# Patient Record
Sex: MALE | Race: WHITE | NOT HISPANIC OR LATINO | Employment: UNEMPLOYED | URBAN - METROPOLITAN AREA
[De-identification: names, ages, dates, MRNs, and addresses within clinical notes are randomized per-mention and may not be internally consistent; named-entity substitution may affect disease eponyms.]

---

## 2019-10-19 ENCOUNTER — HOSPITAL ENCOUNTER (EMERGENCY)
Facility: HOSPITAL | Age: 54
Discharge: HOME | End: 2019-10-19
Attending: EMERGENCY MEDICINE | Admitting: EMERGENCY MEDICINE

## 2019-10-19 ENCOUNTER — APPOINTMENT (OUTPATIENT)
Dept: GENERAL RADIOLOGY | Facility: HOSPITAL | Age: 54
End: 2019-10-19

## 2019-10-19 VITALS
OXYGEN SATURATION: 96 % | HEART RATE: 94 BPM | BODY MASS INDEX: 18.61 KG/M2 | SYSTOLIC BLOOD PRESSURE: 135 MMHG | RESPIRATION RATE: 18 BRPM | DIASTOLIC BLOOD PRESSURE: 100 MMHG | HEIGHT: 70 IN | TEMPERATURE: 98.3 F | WEIGHT: 130 LBS

## 2019-10-19 DIAGNOSIS — J98.8 VIRAL RESPIRATORY ILLNESS: Primary | ICD-10-CM

## 2019-10-19 DIAGNOSIS — B97.89 VIRAL RESPIRATORY ILLNESS: Primary | ICD-10-CM

## 2019-10-19 DIAGNOSIS — R06.00 DYSPNEA, UNSPECIFIED TYPE: ICD-10-CM

## 2019-10-19 DIAGNOSIS — R06.2 WHEEZING: ICD-10-CM

## 2019-10-19 LAB
ANION GAP SERPL CALCULATED.3IONS-SCNC: 10.3 MMOL/L (ref 5–15)
BASOPHILS # BLD AUTO: 0.04 10*3/MM3 (ref 0–0.2)
BASOPHILS NFR BLD AUTO: 0.4 % (ref 0–1.5)
BUN BLD-MCNC: 10 MG/DL (ref 6–20)
BUN/CREAT SERPL: 16.4 (ref 7–25)
CALCIUM SPEC-SCNC: 9.9 MG/DL (ref 8.6–10.5)
CHLORIDE SERPL-SCNC: 88 MMOL/L (ref 98–107)
CO2 SERPL-SCNC: 31.7 MMOL/L (ref 22–29)
CREAT BLD-MCNC: 0.61 MG/DL (ref 0.76–1.27)
DEPRECATED RDW RBC AUTO: 41.3 FL (ref 37–54)
EOSINOPHIL # BLD AUTO: 0.05 10*3/MM3 (ref 0–0.4)
EOSINOPHIL NFR BLD AUTO: 0.6 % (ref 0.3–6.2)
ERYTHROCYTE [DISTWIDTH] IN BLOOD BY AUTOMATED COUNT: 12.8 % (ref 12.3–15.4)
FLUAV AG NPH QL: NEGATIVE
FLUBV AG NPH QL IA: NEGATIVE
GFR SERPL CREATININE-BSD FRML MDRD: 139 ML/MIN/1.73
GLUCOSE BLD-MCNC: 104 MG/DL (ref 65–99)
HCT VFR BLD AUTO: 46.1 % (ref 37.5–51)
HGB BLD-MCNC: 14.8 G/DL (ref 13–17.7)
IMM GRANULOCYTES # BLD AUTO: 0.02 10*3/MM3 (ref 0–0.05)
IMM GRANULOCYTES NFR BLD AUTO: 0.2 % (ref 0–0.5)
LYMPHOCYTES # BLD AUTO: 1.14 10*3/MM3 (ref 0.7–3.1)
LYMPHOCYTES NFR BLD AUTO: 12.8 % (ref 19.6–45.3)
MCH RBC QN AUTO: 28.2 PG (ref 26.6–33)
MCHC RBC AUTO-ENTMCNC: 32.1 G/DL (ref 31.5–35.7)
MCV RBC AUTO: 87.8 FL (ref 79–97)
MONOCYTES # BLD AUTO: 0.37 10*3/MM3 (ref 0.1–0.9)
MONOCYTES NFR BLD AUTO: 4.1 % (ref 5–12)
NEUTROPHILS # BLD AUTO: 7.3 10*3/MM3 (ref 1.7–7)
NEUTROPHILS NFR BLD AUTO: 81.9 % (ref 42.7–76)
NRBC BLD AUTO-RTO: 0 /100 WBC (ref 0–0.2)
PLATELET # BLD AUTO: 335 10*3/MM3 (ref 140–450)
PMV BLD AUTO: 8.4 FL (ref 6–12)
POTASSIUM BLD-SCNC: 4.4 MMOL/L (ref 3.5–5.2)
PROCALCITONIN SERPL-MCNC: 0.02 NG/ML (ref 0.1–0.25)
RBC # BLD AUTO: 5.25 10*6/MM3 (ref 4.14–5.8)
SODIUM BLD-SCNC: 130 MMOL/L (ref 136–145)
WBC NRBC COR # BLD: 8.92 10*3/MM3 (ref 3.4–10.8)

## 2019-10-19 PROCEDURE — 85025 COMPLETE CBC W/AUTO DIFF WBC: CPT | Performed by: EMERGENCY MEDICINE

## 2019-10-19 PROCEDURE — 71045 X-RAY EXAM CHEST 1 VIEW: CPT

## 2019-10-19 PROCEDURE — 99284 EMERGENCY DEPT VISIT MOD MDM: CPT

## 2019-10-19 PROCEDURE — 84145 PROCALCITONIN (PCT): CPT | Performed by: EMERGENCY MEDICINE

## 2019-10-19 PROCEDURE — 94640 AIRWAY INHALATION TREATMENT: CPT

## 2019-10-19 PROCEDURE — 87804 INFLUENZA ASSAY W/OPTIC: CPT | Performed by: EMERGENCY MEDICINE

## 2019-10-19 PROCEDURE — 93005 ELECTROCARDIOGRAM TRACING: CPT | Performed by: EMERGENCY MEDICINE

## 2019-10-19 PROCEDURE — 80048 BASIC METABOLIC PNL TOTAL CA: CPT | Performed by: EMERGENCY MEDICINE

## 2019-10-19 PROCEDURE — 94799 UNLISTED PULMONARY SVC/PX: CPT

## 2019-10-19 RX ORDER — IPRATROPIUM BROMIDE AND ALBUTEROL SULFATE 2.5; .5 MG/3ML; MG/3ML
3 SOLUTION RESPIRATORY (INHALATION) ONCE
Status: COMPLETED | OUTPATIENT
Start: 2019-10-19 | End: 2019-10-19

## 2019-10-19 RX ORDER — ALBUTEROL SULFATE 90 UG/1
2 AEROSOL, METERED RESPIRATORY (INHALATION) EVERY 4 HOURS PRN
Qty: 1 INHALER | Refills: 0 | Status: SHIPPED | OUTPATIENT
Start: 2019-10-19

## 2019-10-19 RX ORDER — PREDNISONE 50 MG/1
50 TABLET ORAL DAILY
Qty: 4 TABLET | Refills: 0 | Status: SHIPPED | OUTPATIENT
Start: 2019-10-19

## 2019-10-19 RX ADMIN — IPRATROPIUM BROMIDE AND ALBUTEROL SULFATE 3 ML: 2.5; .5 SOLUTION RESPIRATORY (INHALATION) at 15:42

## 2019-10-19 NOTE — ED PROVIDER NOTES
EMERGENCY DEPARTMENT ENCOUNTER    Room Number:  38/38  Date of encounter:  10/19/2019  PCP: Tonya Gonzalez MD  Historian: Patient      HPI:  Chief Complaint: Shortness of breath  A complete HPI/ROS/PMH/PSH/SH/FH are unobtainable due to: None    Context: Ankit Aguilera is a 54 y.o. male who presents to the ED c/o proximally 1 month of breathing issues, with coughing productive green phlegm.  Family member states that he has not been getting any better, and she finally was able to force him to get checked out today and went to urgent care.  There they recommended ER evaluation.  He was given IV Solu-Medrol at the urgent care, and a DuoNeb in route with EMS.  He does feel better after those treatments.  Patient has not been using anything at home, is a current smoker.  Denies measured fevers, chest pains, dizziness, nausea, vomiting, diarrhea.      PAST MEDICAL HISTORY  Active Ambulatory Problems     Diagnosis Date Noted   • No Active Ambulatory Problems     Resolved Ambulatory Problems     Diagnosis Date Noted   • No Resolved Ambulatory Problems     No Additional Past Medical History         PAST SURGICAL HISTORY  Past Surgical History:   Procedure Laterality Date   • FOOT SURGERY           FAMILY HISTORY  History reviewed. No pertinent family history.      SOCIAL HISTORY  Social History     Socioeconomic History   • Marital status: Single     Spouse name: Not on file   • Number of children: Not on file   • Years of education: Not on file   • Highest education level: Not on file   Tobacco Use   • Smoking status: Current Every Day Smoker     Packs/day: 1.50     Types: Cigarettes   • Smokeless tobacco: Never Used         ALLERGIES  Patient has no known allergies.        REVIEW OF SYSTEMS  Review of Systems     All systems reviewed and negative except for those discussed in HPI.       PHYSICAL EXAM    I have reviewed the triage vital signs and nursing notes.    ED Triage Vitals [10/19/19 1510]   Temp Heart Rate Resp BP  SpO2   -- 87 24 (!) 174/111 98 %      Temp src Heart Rate Source Patient Position BP Location FiO2 (%)   -- -- -- -- --       Physical Exam   Constitutional: He is oriented to person, place, and time and well-developed, well-nourished, and in no distress. No distress.   HENT:   Head: Normocephalic.   Mouth/Throat: Mucous membranes are normal.   Eyes: EOM are normal.   Neck: Normal range of motion.   Cardiovascular: Normal rate, regular rhythm, normal heart sounds and intact distal pulses. Exam reveals no gallop and no friction rub.   No murmur heard.  Pulmonary/Chest: Effort normal. No stridor. No respiratory distress (No increased work of breathing). He has wheezes (mild end-expiratory wheezes diffusely). He has no rhonchi. He has no rales.   Diminished breath sounds in the bases   Abdominal: Soft. He exhibits no distension. There is no tenderness. There is no guarding.   Musculoskeletal: Normal range of motion. He exhibits no deformity.   Neurological: He is alert and oriented to person, place, and time. GCS score is 15.   moving all extremities, no focal deficits   Skin: Skin is warm and dry. He is not diaphoretic.   Psychiatric:   Calm, cooperative   Nursing note and vitals reviewed.          LAB RESULTS  Recent Results (from the past 24 hour(s))   Basic Metabolic Panel    Collection Time: 10/19/19  3:36 PM   Result Value Ref Range    Glucose 104 (H) 65 - 99 mg/dL    BUN 10 6 - 20 mg/dL    Creatinine 0.61 (L) 0.76 - 1.27 mg/dL    Sodium 130 (L) 136 - 145 mmol/L    Potassium 4.4 3.5 - 5.2 mmol/L    Chloride 88 (L) 98 - 107 mmol/L    CO2 31.7 (H) 22.0 - 29.0 mmol/L    Calcium 9.9 8.6 - 10.5 mg/dL    eGFR Non African Amer 139 >60 mL/min/1.73    BUN/Creatinine Ratio 16.4 7.0 - 25.0    Anion Gap 10.3 5.0 - 15.0 mmol/L   Procalcitonin    Collection Time: 10/19/19  3:36 PM   Result Value Ref Range    Procalcitonin 0.02 (L) 0.10 - 0.25 ng/mL   CBC Auto Differential    Collection Time: 10/19/19  3:36 PM   Result Value  Ref Range    WBC 8.92 3.40 - 10.80 10*3/mm3    RBC 5.25 4.14 - 5.80 10*6/mm3    Hemoglobin 14.8 13.0 - 17.7 g/dL    Hematocrit 46.1 37.5 - 51.0 %    MCV 87.8 79.0 - 97.0 fL    MCH 28.2 26.6 - 33.0 pg    MCHC 32.1 31.5 - 35.7 g/dL    RDW 12.8 12.3 - 15.4 %    RDW-SD 41.3 37.0 - 54.0 fl    MPV 8.4 6.0 - 12.0 fL    Platelets 335 140 - 450 10*3/mm3    Neutrophil % 81.9 (H) 42.7 - 76.0 %    Lymphocyte % 12.8 (L) 19.6 - 45.3 %    Monocyte % 4.1 (L) 5.0 - 12.0 %    Eosinophil % 0.6 0.3 - 6.2 %    Basophil % 0.4 0.0 - 1.5 %    Immature Grans % 0.2 0.0 - 0.5 %    Neutrophils, Absolute 7.30 (H) 1.70 - 7.00 10*3/mm3    Lymphocytes, Absolute 1.14 0.70 - 3.10 10*3/mm3    Monocytes, Absolute 0.37 0.10 - 0.90 10*3/mm3    Eosinophils, Absolute 0.05 0.00 - 0.40 10*3/mm3    Basophils, Absolute 0.04 0.00 - 0.20 10*3/mm3    Immature Grans, Absolute 0.02 0.00 - 0.05 10*3/mm3    nRBC 0.0 0.0 - 0.2 /100 WBC   Influenza Antigen, Rapid - Swab, Nasopharynx    Collection Time: 10/19/19  3:40 PM   Result Value Ref Range    Influenza A Ag, EIA Negative Negative    Influenza B Ag, EIA Negative Negative       Ordered the above labs and independently reviewed the results.        RADIOLOGY  Xr Chest 1 View    Result Date: 10/19/2019  EXAMINATION: SINGLE VIEW CHEST RADIOGRAPH  HISTORY: 54-year-old male with history of dyspnea.  FINDINGS: A semierect AP portable chest radiograph was obtained. No prior examination is available for comparison. The lungs are normal in volume and are clear of consolidation. A calcified granuloma is seen within the right mid lung field. The cardiomediastinal silhouette and osseous structures appear normal.      There is no evidence for an active or acute cardiopulmonary process.  This report was finalized on 10/19/2019 4:19 PM by Dr. Ernst oGuld M.D.        I ordered the above noted radiological studies. Reviewed by me and discussed with radiologist.  See dictation for official radiology  interpretation.      PROCEDURES    Procedures   EKG    EKG Time: 1529  Rhythm/Rate: Sinus rhythm with rate of 97  Nml axis  Nml intervals   No acute ischemic changes  No STEMI     Interpreted Contemporaneously by me, independently viewed  No prior        MEDICATIONS GIVEN IN ER    Medications   ipratropium-albuterol (DUO-NEB) nebulizer solution 3 mL (3 mL Nebulization Given 10/19/19 1542)         PROGRESS, DATA ANALYSIS, CONSULTS, AND MEDICAL DECISION MAKING    All labs have been independently reviewed by me.  All radiology studies have been reviewed by me and discussed with radiologist dictating the report.   EKG's independently viewed and interpreted by me.  Discussion below represents my analysis of pertinent findings related to patient's condition, differential diagnosis, treatment plan and final disposition.        ED Course as of Oct 19 1951   Sat Oct 19, 2019   1554 Patient reevaluated after DuoNeb, reports feeling even better, no residual wheezing, no increased work of breathing, clear breath sounds bilaterally with 100% oxygen on room air.  [DC]   1625 Patient comes in today for evaluation of 1 month of respiratory issues.  Is a current smoker and has been for a long time.  No prior diagnosis of COPD.  Likely viral infection aggravating likely undiagnosed COPD.  Significant improved after 2 DuoNeb treatments, one with EMS, one in the emergency department.  Has already received IV Solu-Medrol.  Will be discharged home with a prescription for an albuterol inhaler and oral steroids, advised to follow-up with his primary care provider next week, and return to the emergency department for worsening symptoms as needed.  No evidence of bacterial pneumonia on labs or imaging today.  [DC]   1635 Patient on unremarkable findings today, he is comfortable going home and is feeling significantly better.  He is well-appearing overall.  He understands and agrees with course and plan and discharge instructions.  [DC]       ED Course User Index  [DC] Denis Wang MD       AS OF 7:51 PM VITALS:    BP - 135/100  HR - 94  TEMP - 98.3 °F (36.8 °C) (Oral)  02 SATS - 96%        DIAGNOSIS  Final diagnoses:   Viral respiratory illness   Wheezing   Dyspnea, unspecified type         DISPOSITION  DISCHARGE    Patient discharged in stable condition.    Reviewed implications of results, diagnosis, meds, responsibility to follow up, warning signs and symptoms of possible worsening, potential complications and reasons to return to ER.    Patient/Family voiced understanding of above instructions.    Discussed plan for discharge, as there is no emergent indication for admission. Patient referred to primary care provider for BP management due to today's BP. Pt/family is agreeable and understands need for follow up and repeat testing.  Pt is aware that discharge does not mean that nothing is wrong but it indicates no emergency is present that requires admission and they must continue care with follow-up as given below or physician of their choice.     FOLLOW-UP  [facility] Emergency Department  [address]  [facility] [address] [phone]    As needed, If symptoms worsen    Tonya Gonzalez MD  [address] [facility] [address]  [phone]    Schedule an appointment as soon as possible for a visit       [facility]  [address] [facility] [address]  [facility] [id number] [phone]  Schedule an appointment as soon as possible for a visit            Medication List      New Prescriptions    albuterol sulfate  (90 Base) MCG/ACT inhaler  Commonly known as:  PROVENTIL HFA;VENTOLIN HFA;PROAIR HFA  Inhale 2 puffs Every 4 (Four) Hours As Needed for Wheezing.     predniSONE 50 MG tablet  Commonly known as:  DELTASONE  Take 1 tablet by mouth Daily. Take with food                       Denis Wang MD  10/19/19 1951

## 2019-10-19 NOTE — ED TRIAGE NOTES
Pt reports SOA went to [facility] in [place], gave patient 125 mg Solu-medrol IM, and EMS gave patient duo neb with mild improvement.

## 2019-10-19 NOTE — DISCHARGE INSTRUCTIONS
Take medications as prescribed, use your inhaler every 4 hours for the next 24 hours, then every 4 hours only as needed for wheezing or severe shortness of breath.  Follow-up with your primary care provider for recheck next week, follow-up with pulmonary to establish a lung doctor, return to the emergency department for worsening symptoms as needed.

## 2019-10-21 ENCOUNTER — HOSPITAL ENCOUNTER (EMERGENCY)
Facility: HOSPITAL | Age: 54
Discharge: ANOTHER HOSPITAL | End: 2019-10-21
Attending: EMERGENCY MEDICINE | Admitting: EMERGENCY MEDICINE

## 2019-10-21 ENCOUNTER — APPOINTMENT (OUTPATIENT)
Dept: GENERAL RADIOLOGY | Facility: HOSPITAL | Age: 54
End: 2019-10-21

## 2019-10-21 ENCOUNTER — APPOINTMENT (OUTPATIENT)
Dept: CT IMAGING | Facility: HOSPITAL | Age: 54
End: 2019-10-21

## 2019-10-21 VITALS
HEIGHT: 70 IN | DIASTOLIC BLOOD PRESSURE: 90 MMHG | RESPIRATION RATE: 16 BRPM | OXYGEN SATURATION: 96 % | WEIGHT: 130 LBS | BODY MASS INDEX: 18.61 KG/M2 | TEMPERATURE: 98.2 F | HEART RATE: 91 BPM | SYSTOLIC BLOOD PRESSURE: 106 MMHG

## 2019-10-21 DIAGNOSIS — R06.1 INTERMITTENT STRIDOR: ICD-10-CM

## 2019-10-21 DIAGNOSIS — J38.7 LARYNGEAL MASS: Primary | ICD-10-CM

## 2019-10-21 LAB
ALBUMIN SERPL-MCNC: 4.1 G/DL (ref 3.5–5.2)
ALBUMIN/GLOB SERPL: 1.4 G/DL
ALP SERPL-CCNC: 77 U/L (ref 39–117)
ALT SERPL W P-5'-P-CCNC: 12 U/L (ref 1–41)
ANION GAP SERPL CALCULATED.3IONS-SCNC: 10.1 MMOL/L (ref 5–15)
AST SERPL-CCNC: 17 U/L (ref 1–40)
BASOPHILS # BLD AUTO: 0.01 10*3/MM3 (ref 0–0.2)
BASOPHILS NFR BLD AUTO: 0.1 % (ref 0–1.5)
BILIRUB SERPL-MCNC: 0.2 MG/DL (ref 0.2–1.2)
BUN BLD-MCNC: 13 MG/DL (ref 6–20)
BUN/CREAT SERPL: 23.2 (ref 7–25)
CALCIUM SPEC-SCNC: 9.6 MG/DL (ref 8.6–10.5)
CHLORIDE SERPL-SCNC: 93 MMOL/L (ref 98–107)
CO2 SERPL-SCNC: 30.9 MMOL/L (ref 22–29)
CREAT BLD-MCNC: 0.56 MG/DL (ref 0.76–1.27)
DEPRECATED RDW RBC AUTO: 40.7 FL (ref 37–54)
EOSINOPHIL # BLD AUTO: 0.02 10*3/MM3 (ref 0–0.4)
EOSINOPHIL NFR BLD AUTO: 0.2 % (ref 0.3–6.2)
ERYTHROCYTE [DISTWIDTH] IN BLOOD BY AUTOMATED COUNT: 12.7 % (ref 12.3–15.4)
GFR SERPL CREATININE-BSD FRML MDRD: >150 ML/MIN/1.73
GLOBULIN UR ELPH-MCNC: 3 GM/DL
GLUCOSE BLD-MCNC: 104 MG/DL (ref 65–99)
HCT VFR BLD AUTO: 40.2 % (ref 37.5–51)
HGB BLD-MCNC: 13.5 G/DL (ref 13–17.7)
HOLD SPECIMEN: NORMAL
HOLD SPECIMEN: NORMAL
IMM GRANULOCYTES # BLD AUTO: 0.06 10*3/MM3 (ref 0–0.05)
IMM GRANULOCYTES NFR BLD AUTO: 0.6 % (ref 0–0.5)
LYMPHOCYTES # BLD AUTO: 1.7 10*3/MM3 (ref 0.7–3.1)
LYMPHOCYTES NFR BLD AUTO: 16.2 % (ref 19.6–45.3)
MCH RBC QN AUTO: 29.7 PG (ref 26.6–33)
MCHC RBC AUTO-ENTMCNC: 33.6 G/DL (ref 31.5–35.7)
MCV RBC AUTO: 88.4 FL (ref 79–97)
MONOCYTES # BLD AUTO: 0.72 10*3/MM3 (ref 0.1–0.9)
MONOCYTES NFR BLD AUTO: 6.9 % (ref 5–12)
NEUTROPHILS # BLD AUTO: 7.97 10*3/MM3 (ref 1.7–7)
NEUTROPHILS NFR BLD AUTO: 76 % (ref 42.7–76)
NRBC BLD AUTO-RTO: 0 /100 WBC (ref 0–0.2)
NT-PROBNP SERPL-MCNC: 116.7 PG/ML (ref 5–900)
PLATELET # BLD AUTO: 302 10*3/MM3 (ref 140–450)
PMV BLD AUTO: 8.6 FL (ref 6–12)
POTASSIUM BLD-SCNC: 4.3 MMOL/L (ref 3.5–5.2)
PROT SERPL-MCNC: 7.1 G/DL (ref 6–8.5)
RBC # BLD AUTO: 4.55 10*6/MM3 (ref 4.14–5.8)
SODIUM BLD-SCNC: 134 MMOL/L (ref 136–145)
TROPONIN T SERPL-MCNC: <0.01 NG/ML (ref 0–0.03)
WBC NRBC COR # BLD: 10.48 10*3/MM3 (ref 3.4–10.8)
WHOLE BLOOD HOLD SPECIMEN: NORMAL
WHOLE BLOOD HOLD SPECIMEN: NORMAL

## 2019-10-21 PROCEDURE — 99284 EMERGENCY DEPT VISIT MOD MDM: CPT

## 2019-10-21 PROCEDURE — 84484 ASSAY OF TROPONIN QUANT: CPT | Performed by: PHYSICIAN ASSISTANT

## 2019-10-21 PROCEDURE — 94799 UNLISTED PULMONARY SVC/PX: CPT

## 2019-10-21 PROCEDURE — 25010000002 IOPAMIDOL 61 % SOLUTION: Performed by: EMERGENCY MEDICINE

## 2019-10-21 PROCEDURE — 93005 ELECTROCARDIOGRAM TRACING: CPT | Performed by: PHYSICIAN ASSISTANT

## 2019-10-21 PROCEDURE — 83880 ASSAY OF NATRIURETIC PEPTIDE: CPT | Performed by: PHYSICIAN ASSISTANT

## 2019-10-21 PROCEDURE — 70491 CT SOFT TISSUE NECK W/DYE: CPT

## 2019-10-21 PROCEDURE — 93010 ELECTROCARDIOGRAM REPORT: CPT | Performed by: INTERNAL MEDICINE

## 2019-10-21 PROCEDURE — 70360 X-RAY EXAM OF NECK: CPT

## 2019-10-21 PROCEDURE — 80053 COMPREHEN METABOLIC PANEL: CPT | Performed by: PHYSICIAN ASSISTANT

## 2019-10-21 PROCEDURE — 71046 X-RAY EXAM CHEST 2 VIEWS: CPT

## 2019-10-21 PROCEDURE — 94640 AIRWAY INHALATION TREATMENT: CPT

## 2019-10-21 PROCEDURE — 25010000002 METHYLPREDNISOLONE PER 125 MG: Performed by: PHYSICIAN ASSISTANT

## 2019-10-21 PROCEDURE — 96374 THER/PROPH/DIAG INJ IV PUSH: CPT

## 2019-10-21 PROCEDURE — 85025 COMPLETE CBC W/AUTO DIFF WBC: CPT | Performed by: PHYSICIAN ASSISTANT

## 2019-10-21 RX ORDER — ALBUTEROL SULFATE 2.5 MG/3ML
2.5 SOLUTION RESPIRATORY (INHALATION) ONCE
Status: COMPLETED | OUTPATIENT
Start: 2019-10-21 | End: 2019-10-21

## 2019-10-21 RX ORDER — IPRATROPIUM BROMIDE AND ALBUTEROL SULFATE 2.5; .5 MG/3ML; MG/3ML
3 SOLUTION RESPIRATORY (INHALATION) ONCE
Status: COMPLETED | OUTPATIENT
Start: 2019-10-21 | End: 2019-10-21

## 2019-10-21 RX ORDER — METHYLPREDNISOLONE SODIUM SUCCINATE 125 MG/2ML
125 INJECTION, POWDER, LYOPHILIZED, FOR SOLUTION INTRAMUSCULAR; INTRAVENOUS ONCE
Status: COMPLETED | OUTPATIENT
Start: 2019-10-21 | End: 2019-10-21

## 2019-10-21 RX ADMIN — METHYLPREDNISOLONE SODIUM SUCCINATE 125 MG: 125 INJECTION, POWDER, FOR SOLUTION INTRAMUSCULAR; INTRAVENOUS at 09:56

## 2019-10-21 RX ADMIN — IPRATROPIUM BROMIDE AND ALBUTEROL SULFATE 3 ML: 2.5; .5 SOLUTION RESPIRATORY (INHALATION) at 09:52

## 2019-10-21 RX ADMIN — IPRATROPIUM BROMIDE AND ALBUTEROL SULFATE 3 ML: 2.5; .5 SOLUTION RESPIRATORY (INHALATION) at 12:00

## 2019-10-21 RX ADMIN — ALBUTEROL SULFATE 2.5 MG: 2.5 SOLUTION RESPIRATORY (INHALATION) at 13:44

## 2019-10-21 RX ADMIN — IOPAMIDOL 75 ML: 612 INJECTION, SOLUTION INTRAVENOUS at 14:57

## 2019-10-21 NOTE — ED PROVIDER NOTES
Pt presents to the ED c/o SOA that has been ongoing for one month. His SOA worsened significantly last night. He reports weight loss and cough but denies dysphagia, night sweats, CP, fever, abdominal pain, sore throat, vomiting and all other complaints at this time. Patient was evaluated in [facility] ED two days ago for similar symptoms, which have worsened since that time. 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      On exam,   Patient is awake, alert and in no acute distress.   Heart is RRR.  Lungs are CTAB.   Mild stridor present.   No respiratory distress.   Mild dysphonia.       EKG          EKG time: 0937  Rhythm/Rate: SR 79  P waves and NV: nml  QRS, axis: nml   ST and T waves: no acute ischemic changes     Interpreted Contemporaneously by me, independently viewed  Similar compared to prior 10/19/19      Results:  Negative troponin  Negative CXR    Xr Neck Soft Tissue    Result Date: 10/21/2019  Narrative: XR NECK SOFT TISSUE-  INDICATIONS: Stridor  TECHNIQUE:  COMPARISON: None available  FINDINGS:  The epiglottis appears unremarkable. Apparent prevertebral soft tissue fullness at the lower cervical levels could potentially be inflammatory in nature or could be evidence of a space-occupying process/neoplasm. Soft tissue calcifications are seen at the expected location of the cervical carotid arteries. Degenerative changes of the cervical spine are more conspicuous at the lower cervical levels. On the frontal view, the lower cervical airway appears narrowed in transverse dimension. Consider further evaluation with neck CT.      Impression:  As described.    Discussed by telephone with Zaria Valdes at 1325, 10/21/2019.  This report was finalized on 10/21/2019 1:27 PM by Dr. Wilberto Batres M.D.      Xr Chest 2 View    Result Date: 10/21/2019  Narrative: Chest radiograph  HISTORY:Shortness of air  TECHNIQUE: Two PA and lateral radiographs  COMPARISON:Chest radiograph  10/19/2019  FINDINGS: There is no pulmonary consolidation. Sequela of prior granulomatous disease is present.. There is no pleural effusion. No pneumothorax is seen. The heart is normal in size.      Impression: No findings of acute cardiopulmonary pathology.  This report was finalized on 10/21/2019 10:42 AM by Dr. Cas Quintana M.D.      Ct Soft Tissue Neck With Contrast    Result Date: 10/22/2019  Narrative: CT SCAN OF THE SOFT TISSUES OF NECK WITH CONTRAST  CLINICAL HISTORY: Stridor, shortness of air, and cough.  CT scan of the soft tissues of the neck was obtained with 3 mm axial images following the administration of IV contrast.  FINDINGS: There are findings most likely representative of a glottic mass with transglottic extension, narrowing of the subglottic airway. The true and false vocal cords are poorly visualized and presumably invaded by tumor.  Paraglottic fat has abnormal soft tissue within it. Additionally, the preepiglottic fat is remarkable for abnormal soft tissue density which is felt to almost likely represent tumor. The right arytenoid cartilage is not well seen and the left arytenoid is sclerotic and both of these cartilages may be invaded by tumor. Additionally, there is a circumferential mass with eccentricity along the right lateral aspect and posterior aspect of the subglottic airway. Overall, this mass is difficult to distinctly measure although I suspect that the tumor extends a craniocaudad span of approximately 3.8 cm circumferentially involving the glottis and supraglottis. The involvement of the subglottic airway is more eccentric to the right and posteriorly. This mass markedly narrows the hypopharyngeal airway which measures up to a minimal diameter of approximately 2.3 x 5.6 mm. Additionally, the mass narrows the subglottic airway as well but to a lesser extent. There is no pathologic lymphadenopathy within the neck.  Incidental note is made of atherosclerotic changes within the  visualized intracranial vasculature. This prominently involves the intracranial segment of the right vertebral artery where there is up to a severe degree of stenosis in the basilar artery where there are moderate degrees of stenosis. Additionally, areas of atherosclerotic involvement are noted within the cavernous segments of the internal carotids. The orbits are unremarkable in appearance. The parotid and submandibular glands are within normal limits. The oral cavity and tongue are unremarkable.  There are emphysematous changes identified within the visualized lung apices. Additionally, there are bilateral pleural plaques.  Prominent atherosclerotic changes are identified within the left axillary artery which is severely stenotic. A moderate degree of stenosis is seen within the proximal left subclavian artery. The right vertebral artery is likely occluded at its origin. There is reconstitution of the cervical segment of the right vertebral artery. There is a moderate degree of stenosis involving the origin of the left common carotid artery. Prominent atherosclerotic changes are identified within the proximal internal carotids as well. The findings are most prominently seen within the proximal portion of the right ICA where I suspect that there is an approximate 60% stenosis by NASCET criteria. Of course, these areas of atherosclerotic involvement within the proximal internal carotids could be more accurately assessed on a vascular imaging study.  Incidentally noted are changes of inflammatory paranasal sinus disease within the sphenoid sinus on the right side where there is surrounding chronic osteitic changes.  Incidental note is made of multilevel degenerative phenomena within the cervical spine which is most prominently seen at the C5-6 level. A disc osteophyte complex at C5-6 results in moderate canal stenosis. There is also a mild-to-moderate degree of left and a moderate-to-severe degree of right foraminal  narrowing secondary to uncovertebral joint hypertrophy.      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 The supraglottic extent of the tumor appears to be circumferential, severely narrowing the supraglottic airway which measures a minimum diameter of 2.3 x 5.6 mm. The tumor likely circumferentially involves the true and false vocal cords which are not distinctly visualized on either side. There is involvement of the paraglottic fat and the preepiglottic fat appears somewhat infiltrated as well. The right arytenoid is not visualized and the left arytenoid appears sclerotic and irregular. There may be invasion of the arytenoids. The mass likely involves both the anterior and posterior commissures and has extension of the subglottic airway which is eccentrically narrowed as well. The posterior and right lateral aspect of the cricoid may also be invaded as they are not well delineated. The mass is difficult to distinctly measure although it extends a craniocaudad span of approximately 3.8 cm. No pathologic lymphadenopathy is identified.  Emphysematous changes are visualized within the lung apices.  Extensive intracranial and extracranial atherosclerotic changes are identified which have been delineated in detail above. 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 Within the proximal portion of the right ICA, there is an approximate 60% NASCET stenosis. Of course, this would be better delineated on a dedicated vascular imaging study. Intracranially, prominent atherosclerotic changes are identified within the intracranial segment of the right vertebral artery which appears to be severely stenotic  and a moderate degree of stenosis is seen within the basilar artery.  There are incidental pleural plaques appreciated within the visualized hemithoraces bilaterally.  Changes of chronic inflammatory paranasal sinus disease is seen within the right aspect of the sphenoid sinus.  These findings were discussed with Zaria Valdes on 10/21/2019 at approximately 3:30 PM.  Radiation dose reduction techniques were utilized, including automated exposure control and exposure modulation based on body size.  This report was finalized on 10/22/2019 7:29 AM by Dr. Gilberto Mcnulty M.D.      Xr Chest 1 View    Result Date: 10/19/2019  Narrative: EXAMINATION: SINGLE VIEW CHEST RADIOGRAPH  HISTORY: 54-year-old male with history of dyspnea.  FINDINGS: A semierect AP portable chest radiograph was obtained. No prior examination is available for comparison. The lungs are normal in volume and are clear of consolidation. A calcified granuloma is seen within the right mid lung field. The cardiomediastinal silhouette and osseous structures appear normal.      Impression: There is no evidence for an active or acute cardiopulmonary process.  This report was finalized on 10/19/2019 4:19 PM by Dr. Ernst Gould M.D.      ED course:  ED Course as of Oct 23 1153   Mon Oct 21, 2019   1537 Dr. Mcnulty reports CT soft tissue neck shows supraglottic mass with transglottic extension to the subglottic area.  There is severe narrowing of the supraglottic airway.  [KA]   1556 I discussed the patient CT findings with him.  Advised him of the mass, likelihood of cancer, need for intervention due to severe airway narrowing.  Call out to your nose and throat, will update with plan once I hear back from them.  He is still stridorous, in no distress, vital signs stable.  He tells me that he has had worsening hoarseness of his voice for the last several years and has had difficulty eating and breathing at the same time for a month  [KA]   1557 Discussed with   Ingrid of ENT who recommends contacting you eval head neck surgery for more definitive treatment.    [KA]   1617 Discussed with Dr. Aguilera, ENT at [facility], and also Dr. Holder, ER physician at [facility], who accepted transfer.  [JR]      ED Course User Index  [JR] Rich Smith MD  [KA] Zaria Valdes PA          Plan: Transfer to [facility] emergency department for evaluation by ENT for specialized care.     Attestation:  The GOPI and I have discussed this patient's history, physical exam, and treatment plan.  I have reviewed the documentation and personally had a face to face interaction with the patient. I affirm the documentation and agree with the treatment and plan.  The attached note describes my personal findings.     --  Documentation assistance provided by naz Pan for Dr ANNAMARIE Smith MD.  Information recorded by the scribe was done at my direction and has been verified and validated by me.       Lisa Pan  10/21/19 1242       Rich Smith MD  10/21/19 1623       Rich Smith MD  10/21/19 1623       Rich Smith MD  10/23/19 1153

## 2019-10-21 NOTE — PROGRESS NOTES
Called [facility] per provider request for ALS crew to transport patient to [facility] ED. Spoke w/[facility] who advised transport can be here within the hour. Notified primary nurse Zamzam. Report to be called by the RN. Records and EMTALA ready. Janelle Lucia RN

## 2019-10-21 NOTE — ED PROVIDER NOTES
EMERGENCY DEPARTMENT ENCOUNTER    Room Number:  23/23  Date seen:  10/21/2019  Time seen: 9:46 AM  PCP: Tonya Gonzalez MD    HPI:  Chief complaint: SOA  Context:Ankit Aguilera is a 54 y.o. male who presents to the ED with c/o SOA that has been progressively worsening for 1 month and acutely worsened last night. He also c/o a productive cough. He denies fever, CP, congestion, sore throat, trouble swallowing, abd pain, and N/V/D. Pt was seen in the ED 2 days ago with the same symptoms. He had a negative CXR, negative rapid flu swab, and unremarkable labs. Pt was discharged with Prednisone and an albuterol inhaler. Pt has used in inhaler w/o relief.  Pt is a 1 pack/day smoker since age 17.    Onset: gradual  Location: respiratory   Duration: worsened last night   Timing: constant   Character: short of breath  Aggravating Factors: none stated  Alleviating Factors: none stated  Severity: moderate     MEDICAL RECORD REVIEW   Pt was seen in the ED 2 days ago with the same symptoms. He had a negative CXR, negative rapid flu swab, and unremarkable labs. Pt was discharged with Prednisone and an albuterol inhaler.    ALLERGIES  Patient has no known allergies.    PAST MEDICAL HISTORY  Active Ambulatory Problems     Diagnosis Date Noted   • No Active Ambulatory Problems     Resolved Ambulatory Problems     Diagnosis Date Noted   • No Resolved Ambulatory Problems     No Additional Past Medical History       PAST SURGICAL HISTORY  Past Surgical History:   Procedure Laterality Date   • FOOT SURGERY         FAMILY HISTORY  History reviewed. No pertinent family history.    SOCIAL HISTORY  Social History     Socioeconomic History   • Marital status: Single     Spouse name: Not on file   • Number of children: Not on file   • Years of education: Not on file   • Highest education level: Not on file   Tobacco Use   • Smoking status: Current Every Day Smoker     Packs/day: 1.50     Types: Cigarettes   • Smokeless tobacco: Never Used        REVIEW OF SYSTEMS  Review of Systems   Constitutional: Negative for chills and fever.   HENT: Negative.    Eyes: Negative.    Respiratory: Positive for cough and shortness of breath.    Cardiovascular: Negative for chest pain.   Gastrointestinal: Negative for abdominal pain.   Genitourinary: Negative.    Musculoskeletal: Negative.    Skin: Negative.    Neurological: Negative.    Psychiatric/Behavioral: Negative.        PHYSICAL EXAM  ED Triage Vitals   Temp Heart Rate Resp BP SpO2   10/21/19 0921 10/21/19 0921 10/21/19 0921 10/21/19 0930 10/21/19 0921   98.5 °F (36.9 °C) 94 18 (!) 152/108 96 %      Temp src Heart Rate Source Patient Position BP Location FiO2 (%)   10/21/19 0921 -- -- -- --   Tympanic         Physical Exam   Constitutional: He is oriented to person, place, and time and well-developed, well-nourished, and in no distress.   HENT:   Head: Normocephalic and atraumatic.   Right Ear: External ear normal.   Left Ear: External ear normal.   Nose: Nose normal.   Mouth/Throat: Oropharynx is clear and moist. No oropharyngeal exudate.   Eyes: Conjunctivae and EOM are normal. Pupils are equal, round, and reactive to light.   Neck: Normal range of motion. Neck supple.   Cardiovascular: Normal rate and regular rhythm.   Pulmonary/Chest: Effort normal. Stridor present. No tachypnea. No respiratory distress. He has wheezes (mild end expiratory ). He has no rhonchi. He has no rales.   Pt is calm and speaks in full sentences.   Musculoskeletal: Normal range of motion. He exhibits no edema.   Neurological: He is alert and oriented to person, place, and time.   Skin: Skin is warm and dry.   Psychiatric: Affect normal.   Nursing note and vitals reviewed.      LAB RESULTS  Recent Results (from the past 24 hour(s))   Comprehensive Metabolic Panel    Collection Time: 10/21/19  9:40 AM   Result Value Ref Range    Glucose 104 (H) 65 - 99 mg/dL    BUN 13 6 - 20 mg/dL    Creatinine 0.56 (L) 0.76 - 1.27 mg/dL    Sodium 134  (L) 136 - 145 mmol/L    Potassium 4.3 3.5 - 5.2 mmol/L    Chloride 93 (L) 98 - 107 mmol/L    CO2 30.9 (H) 22.0 - 29.0 mmol/L    Calcium 9.6 8.6 - 10.5 mg/dL    Total Protein 7.1 6.0 - 8.5 g/dL    Albumin 4.10 3.50 - 5.20 g/dL    ALT (SGPT) 12 1 - 41 U/L    AST (SGOT) 17 1 - 40 U/L    Alkaline Phosphatase 77 39 - 117 U/L    Total Bilirubin 0.2 0.2 - 1.2 mg/dL    eGFR Non African Amer >150 >60 mL/min/1.73    Globulin 3.0 gm/dL    A/G Ratio 1.4 g/dL    BUN/Creatinine Ratio 23.2 7.0 - 25.0    Anion Gap 10.1 5.0 - 15.0 mmol/L   Troponin    Collection Time: 10/21/19  9:40 AM   Result Value Ref Range    Troponin T <0.010 0.000 - 0.030 ng/mL   BNP    Collection Time: 10/21/19  9:40 AM   Result Value Ref Range    proBNP 116.7 5.0 - 900.0 pg/mL   CBC Auto Differential    Collection Time: 10/21/19  9:40 AM   Result Value Ref Range    WBC 10.48 3.40 - 10.80 10*3/mm3    RBC 4.55 4.14 - 5.80 10*6/mm3    Hemoglobin 13.5 13.0 - 17.7 g/dL    Hematocrit 40.2 37.5 - 51.0 %    MCV 88.4 79.0 - 97.0 fL    MCH 29.7 26.6 - 33.0 pg    MCHC 33.6 31.5 - 35.7 g/dL    RDW 12.7 12.3 - 15.4 %    RDW-SD 40.7 37.0 - 54.0 fl    MPV 8.6 6.0 - 12.0 fL    Platelets 302 140 - 450 10*3/mm3    Neutrophil % 76.0 42.7 - 76.0 %    Lymphocyte % 16.2 (L) 19.6 - 45.3 %    Monocyte % 6.9 5.0 - 12.0 %    Eosinophil % 0.2 (L) 0.3 - 6.2 %    Basophil % 0.1 0.0 - 1.5 %    Immature Grans % 0.6 (H) 0.0 - 0.5 %    Neutrophils, Absolute 7.97 (H) 1.70 - 7.00 10*3/mm3    Lymphocytes, Absolute 1.70 0.70 - 3.10 10*3/mm3    Monocytes, Absolute 0.72 0.10 - 0.90 10*3/mm3    Eosinophils, Absolute 0.02 0.00 - 0.40 10*3/mm3    Basophils, Absolute 0.01 0.00 - 0.20 10*3/mm3    Immature Grans, Absolute 0.06 (H) 0.00 - 0.05 10*3/mm3    nRBC 0.0 0.0 - 0.2 /100 WBC   Light Blue Top    Collection Time: 10/21/19  9:40 AM   Result Value Ref Range    Extra Tube hold for add-on    Green Top (Gel)    Collection Time: 10/21/19  9:40 AM   Result Value Ref Range    Extra Tube Hold for add-ons.     Lavender Top    Collection Time: 10/21/19  9:40 AM   Result Value Ref Range    Extra Tube hold for add-on    Gold Top - SST    Collection Time: 10/21/19  9:40 AM   Result Value Ref Range    Extra Tube Hold for add-ons.        I ordered the above labs and reviewed the results    RADIOLOGY  CT Soft Tissue Neck With Contrast   Preliminary Result       There is a mass within the supraglottic region which involves the   glottis and extends into the subglottis as well. The supraglottic extent   of the tumor appears to be circumferential, severely narrowing the   supraglottic airway which measures a minimum diameter of 2.3 x 5.6 mm.   The tumor likely circumferentially involves the true and false vocal   cords which are not distinctly visualized on either side. There is   involvement of the periglottic fat and the preepiglottic fat appears   somewhat infiltrated as well. The right arytenoid is not visualized and   the left arytenoid appears sclerotic and irregular. There may be   invasion of the arytenoids. The mass likely involves both the anterior   and posterior commissures and has extension of the subglottic airway   which is eccentrically narrowed as well. The posterior and right lateral   aspect of the cricoid may also be invaded as they are not well   delineated. The mass is difficult to distinctly measure although it   extends a craniocaudad span of approximately 3.8 cm. No pathologic   lymphadenopathy is identified.       Emphysematous changes are visualized within the lung apices. Extensive   intracranial and extracranial atherosclerotic changes are identified   which have been delineated in detail above. 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 Within the    proximal portion of the right ICA, there is an approximate 60% NASCET   stenosis. Of course, this would be better delineated on a dedicated   vascular imaging study. Intracranially, prominent atherosclerotic   changes are identified within the intracranial segment of the right   vertebral artery which appears to be severely stenotic and a moderate   degree of stenosis is seen within the basilar artery.       There are incidental pleural plaques appreciated within the visualized   hemithoraces bilaterally.       Changes of chronic inflammatory paranasal sinus disease is seen within   the right aspect of the sphenoid sinus.       These findings were discussed with Zaria Valdes on 10/21/2019 at   approximately 3:30 PM.        Radiation dose reduction techniques were utilized, including automated   exposure control and exposure modulation based on body size.              XR Neck Soft Tissue   Final Result       As described.               Discussed by telephone with Zaria Valdes at 1325, 10/21/2019.       This report was finalized on 10/21/2019 1:27 PM by Dr. Wilberto Batres M.D.          XR Chest 2 View   Final Result   No findings of acute cardiopulmonary pathology.       This report was finalized on 10/21/2019 10:42 AM by Dr. Cas Quintana M.D.              I ordered the above noted radiological studies and reviewed the images on the PACS system.     MEDICATIONS GIVEN IN ER  Medications   ipratropium-albuterol (DUO-NEB) nebulizer solution 3 mL (3 mL Nebulization Given 10/21/19 0952)   methylPREDNISolone sodium succinate (SOLU-Medrol) injection 125 mg (125 mg Intravenous Given 10/21/19 0956)   ipratropium-albuterol (DUO-NEB) nebulizer solution 3 mL (3 mL Nebulization Given 10/21/19 1200)   albuterol (PROVENTIL) nebulizer solution 0.083% 2.5 mg/3mL (2.5 mg Nebulization Given 10/21/19 1344)   iopamidol (ISOVUE-300) 61 % injection 100 mL (75 mL Intravenous Given by Other 10/21/19 1483)  "      EKG  Interpreted by ED Physician     PROCEDURES  Procedures      PROGRESS AND CONSULTS    Progress Notes:    ED Course as of Oct 21 1708   Mon Oct 21, 2019   1537 Dr. Mcnulty reports CT soft tissue neck shows supraglottic mass with transglottic extension to the subglottic area.  There is severe narrowing of the supraglottic airway.  [KA]   1555 I discussed the patient CT findings with him.  Advised him of the mass, likelihood of cancer, need for intervention due to severe airway narrowing.  Call out to your nose and throat, will update with plan once I hear back from them.  He is still stridorous, in no distress, vital signs stable.  He tells me that he has had worsening hoarseness of his voice for the last several years and has had difficulty eating and breathing at the same time for a month  [KA]   1557 Discussed with Dr. Quintero of ENT who recommends contacting you eval head neck surgery for more definitive treatment.    [KA]   1617 Discussed with Dr. Aguilera, ENT at [facility], and also Dr. Holder, ER physician at [facility], who accepted transfer.  [JR]      ED Course User Index  [JR] Rich Smith MD  [KA] Zaria Valdes PA     9:41 AM  Duoneb and Solumedrol ordered for SOA.    11:30 AM  Rechecked pt who is resting in NAD. Pt states he is feeling better after receiving the breathing treatment. On re-exam, pt's lungs are CTAB and he is in no distress, continues to have intermittent stridor.     12:37 PM  Reviewed pt's history and workup with Dr. Smith.  After a bedside evaluation; Dr Smith agrees with the plan of care    1:25 PM  Dr. Batres, radiology, reports XR soft tissue neck shows pre vertebral soft tissue swelling. He recommends getting a CT for further evaluation.       Disposition vitals:  /90 (BP Location: Left arm, Patient Position: Sitting)   Pulse 84   Temp 98.5 °F (36.9 °C) (Tympanic)   Resp 16   Ht 177.8 cm (70\")   Wt 59 kg (130 lb)   SpO2 94%   BMI 18.65 kg/m² "       DIAGNOSIS  Final diagnoses:   Laryngeal mass   Intermittent stridor           Documentation assistance provided by naz Bhatia for Zaria Valdes PA-C.  Information recorded by the scribe was done at my direction and has been verified and validated by me.       Verónica Bhatia  10/21/19 1326       Zaria Valdes PA  10/21/19 7915